# Patient Record
Sex: FEMALE
[De-identification: names, ages, dates, MRNs, and addresses within clinical notes are randomized per-mention and may not be internally consistent; named-entity substitution may affect disease eponyms.]

---

## 2017-01-06 PROBLEM — Z00.00 ENCOUNTER FOR PREVENTIVE HEALTH EXAMINATION: Status: ACTIVE | Noted: 2017-01-06

## 2017-01-20 ENCOUNTER — APPOINTMENT (OUTPATIENT)
Dept: BREAST CENTER | Facility: CLINIC | Age: 22
End: 2017-01-20

## 2020-02-10 ENCOUNTER — TRANSCRIPTION ENCOUNTER (OUTPATIENT)
Age: 25
End: 2020-02-10

## 2022-09-30 ENCOUNTER — EMERGENCY (EMERGENCY)
Facility: HOSPITAL | Age: 27
LOS: 1 days | End: 2022-09-30

## 2022-09-30 ENCOUNTER — EMERGENCY (EMERGENCY)
Facility: HOSPITAL | Age: 27
LOS: 1 days | Discharge: HOME | End: 2022-09-30
Attending: EMERGENCY MEDICINE | Admitting: EMERGENCY MEDICINE

## 2022-09-30 VITALS
TEMPERATURE: 99 F | HEART RATE: 92 BPM | OXYGEN SATURATION: 98 % | RESPIRATION RATE: 18 BRPM | SYSTOLIC BLOOD PRESSURE: 125 MMHG | DIASTOLIC BLOOD PRESSURE: 84 MMHG

## 2022-09-30 PROCEDURE — 99284 EMERGENCY DEPT VISIT MOD MDM: CPT

## 2022-09-30 PROCEDURE — L9981: CPT

## 2022-09-30 NOTE — ED ADULT TRIAGE NOTE - CHIEF COMPLAINT QUOTE
Pt c/o swelling on R. upper back noticed 2 years ago, c/o worsening pain. Also c/o dizziness, numbness/pinching sensation on R. 3rd-4th digit on R. hand radiating up to arm since 7pm. Reports blurry vision in R. eye, headache since waking up. Reports blurry vision resolved. Speech clear, no facial droop noted. No drift noted. Evaluated by MD Seay, no code stroke to be called. Denies pmhx.

## 2022-10-01 VITALS
DIASTOLIC BLOOD PRESSURE: 62 MMHG | SYSTOLIC BLOOD PRESSURE: 106 MMHG | RESPIRATION RATE: 16 BRPM | OXYGEN SATURATION: 100 % | HEART RATE: 80 BPM | TEMPERATURE: 99 F

## 2022-10-01 PROCEDURE — 71046 X-RAY EXAM CHEST 2 VIEWS: CPT | Mod: 26

## 2022-10-01 RX ORDER — CYCLOBENZAPRINE HYDROCHLORIDE 10 MG/1
1 TABLET, FILM COATED ORAL
Qty: 10 | Refills: 0
Start: 2022-10-01 | End: 2022-10-05

## 2022-10-01 RX ORDER — ACETAMINOPHEN 500 MG
650 TABLET ORAL ONCE
Refills: 0 | Status: COMPLETED | OUTPATIENT
Start: 2022-10-01 | End: 2022-10-01

## 2022-10-01 RX ORDER — IBUPROFEN 200 MG
600 TABLET ORAL ONCE
Refills: 0 | Status: COMPLETED | OUTPATIENT
Start: 2022-10-01 | End: 2022-10-01

## 2022-10-01 RX ORDER — CYCLOBENZAPRINE HYDROCHLORIDE 10 MG/1
5 TABLET, FILM COATED ORAL ONCE
Refills: 0 | Status: COMPLETED | OUTPATIENT
Start: 2022-10-01 | End: 2022-10-01

## 2022-10-01 RX ADMIN — Medication 600 MILLIGRAM(S): at 02:23

## 2022-10-01 RX ADMIN — Medication 650 MILLIGRAM(S): at 02:24

## 2022-10-01 RX ADMIN — CYCLOBENZAPRINE HYDROCHLORIDE 5 MILLIGRAM(S): 10 TABLET, FILM COATED ORAL at 02:24

## 2022-10-01 NOTE — ED PROVIDER NOTE - ATTENDING CONTRIBUTION TO CARE
Attending Statement: I have personally seen and examined this patient. I have fully participated in the care of this patient. I have reviewed all pertinent clinical information, including history physical exam, plan and the Resident's note and agree except as noted  26yo F hx a scoliosis pw upper back "inflammation" on/off since 2020. States "I get a bump, an inflammation" of the upper back pain on/off, worse  w stress. no trauma. Tonight felt "tingling" of the right hand, concerned came to ED. no weakness. no neck pain. While ED noted to have a low grade temp w slight headache, now improved. no nv/d no cp/sob no travel no sick contact. no AC. not on OCP  no hx of PE or DVT in family   Vital signs noted. resting no distress. EOMI. AOx3, no focal deficits. CN 2-12 grossly intact. nl gait. no meningeal signs. nl finger to nose. nl  bl hands. nl sensation of bl arms. nontender midline cervical/thoracic or lumbar spine. no mass/bruise/rash noted of back. mild tender in the right upper scapula, reproducible to deep palpation.   No resp distress. able to speak in full and clear sentences. no wheeze, rales or stridor. soft nontender abdomen. no  rebound. no guarding. no sign of trauma. no CVAT no pedal edema. no calf tenderness. normal pulses bilateral feet.  plan rvp, cxr, pain med, re assess

## 2022-10-01 NOTE — ED PROVIDER NOTE - CLINICAL SUMMARY MEDICAL DECISION MAKING FREE TEXT BOX
27F without sig pmh cc right upper extremity numbness, w/ back pain will eval with xray and pain control w/ neuro fu

## 2022-10-01 NOTE — ED PROVIDER NOTE - PHYSICAL EXAMINATION
GENERAL: Awake, alert, NAD  HEENT: NC/AT, moist mucous membranes,   LUNGS: CTAB, no wheezes or crackles   CARDIAC: RRR, no m/r/g  ABDOMEN: Soft, non tender, non distended, no rebound, no guarding  BACK: No midline spinal tenderness, no CVA tenderness  NEURO: alert, CN 2-12 intact,, sensation intact throughout, coordination shows no abnormalities motor 5/5 RUE/LUE/RLE/LLE/EHL/Plantar flexion, gait nl   SKIN: Warm and dry. No rash.  PSYCH: Normal affect.

## 2022-10-01 NOTE — ED PROVIDER NOTE - PATIENT PORTAL LINK FT
You can access the FollowMyHealth Patient Portal offered by [facility] by registering at the following website: [email or web address]. By joining [facility]’s FollowMyHealth portal, you will also be able to view your health information using other applications (apps) compatible with our system.

## 2022-10-01 NOTE — ED PROVIDER NOTE - PROGRESS NOTE DETAILS
Gong: Called by triage to eval patient for presenting symptoms.  Pt reports R upper neck pain worsening today with increasing parasethesias down arm with subjective numbness, with no numbness to light touch bilaterally and no pronator drift on exam and upper extremity strength intact.  Ambulated to chair with no acute issues.  Initially reported blurry vision not present at this time, notes persistent headache.  No stroke code call given no gross motor or sensory deficits appreciated. pt fell asleep feeling better, no numbness/weakness. plan dc home w close pmd and neurology fu Hannah PGY 2 pt feeling better given neuro follow up and muscle relaxant

## 2022-10-01 NOTE — ED PROVIDER NOTE - NSFOLLOWUPINSTRUCTIONS_ED_ALL_ED_FT
Today you were seen in the ED for pain in your right arm.     It was found that you have no signs of a medical emergency that needs urgent evaluation.     Please follow up with your primary care provider in regards to today's visit.  In addition, please follow up with a neurologist, information for such can be found below. They will also call you in addition.     You can take Tylenol and Motrin every 8 hours for pain as needed. In addition you can take the muscle relaxant, one a day.     Please return to the ED if you have any of the following:   The arm pain gets more severe and not controlled with at home medication, you develop dizziness, lightheadedness.

## 2022-10-01 NOTE — ED ADULT NURSE NOTE - OBJECTIVE STATEMENT
Patient received in ED bed 1A. A&Ox4 and ambulatory. C/o right hand, middle and ring finger "tense" feeling, that radiates up the right arm to the right shoulder. Pt describes the discomfort as "tense" with occasional "numbness and tingling". States this began last night, 9/30/22 at around 1900, states took 2 Advil at around 2000 with no relief. Pt also c/o headache since she woke up on 9/30/22. Pt able to ambulate with steady gait. No facial droop or weakness noted. Equal strength in bilateral extremities. Patient received in ED bed 1A. A&Ox4 and ambulatory. C/o right hand, middle and ring finger "tense" feeling, that radiates up the right arm to the right shoulder. Pt describes the discomfort as "tense" with occasional "numbness and tingling". States this began last night, 9/30/22 at around 1900, states took 2 Advil at around 2000 with no relief. Pt also c/o headache since she woke up on 9/30/22. Reports 10/10 headache pain level. Also c/o right upper back bump that has been present for 2 years and pain has worsened. Pt states has hx of scoliosis. Pt able to ambulate with steady gait. No facial droop or weakness noted. Equal strength in bilateral extremities. Respirations even and unlabored. Pt speaking in full and complete sentences. Pt appears laying with head slightly elevated in bed. Denies CP, SOB, nausea, vomiting

## 2022-10-01 NOTE — ED PROVIDER NOTE - NSFOLLOWUPCLINICS_GEN_ALL_ED_FT
[facility]  Neurology  [address] [place] [address] [address] [place] [address]  Phone: [phone]  Fax:   Follow Up Time: 1-3 Days

## 2022-10-01 NOTE — ED PROVIDER NOTE - OBJECTIVE STATEMENT
27F without pmh cc right arm tingling sensation / right upper back pain. Pt notes that symptoms started this evening 7pm, had acute numbness in between third and fourth digits. Pt notes she has been having this right upper back pain near her scaphoid over the past two years, today it was acting up .